# Patient Record
(demographics unavailable — no encounter records)

---

## 2024-10-30 NOTE — PROCEDURE
[FreeTextEntry1] : PFTs revealed moderate restrictive dysfunction; FEV1 was 0.95 L, which is 53.1% of predicted; faint inspiratory limb.  PFTs were performed to evaluate for SOB  FENO was 15; a normal value being less than 25 Fractional exhaled nitric oxide (FENO) is regarded as a simple, noninvasive method for assessing eosinophilic airway inflammation. Produced by a variety of cells within the lung, nitric oxide (NO) concentrations are generally low in healthy individuals. However, high concentrations of NO appear to be involved in nonspecific host defense mechanisms and chronic inflammatory diseases such as asthma. The American Thoracic Society (ATS) therefore has recommended using FENO to aid in the diagnosis and monitoring of eosinophilic airway inflammation and asthma, and for identifying steroid responsive individuals whose chronic respiratory symptoms may be caused by airway inflammation.   The American Thoracic Society (ATS) strongly recommends the use of FeNO measurement to aid in the assessment, management, and long-term monitoring of asthma. In their 2011 clinical practice guideline, the ATS emphasizes the importance of using FeNO.

## 2024-10-30 NOTE — ASSESSMENT
[FreeTextEntry1] : Mrs. Brown is 83 yo has a history of asthma, OSAS, allergies, sinus issues, abnormal CT 4/18 (nodule), mild PAH, obesity and bronchomalacia with ?need for cardiac ablation, anxiety. (still present) - better compliant with asthma Rx- reduced weight on Wegovy recent sinus infection/active asthma-s/p CHF  Her cough is multifactorial due to: -postnasal drip -asthma (flair) -GERD -obesity -out of shape -Afib -PAH -poor mechanics of breathing -bronchomalacia   Problem 1: Asthma -active due to NC -Continue Xopenex.63% nebulizer q6H -Add Trelegy 200 1 inhalation qD  -s/p Prednisone 20mg for 7 days then 10mg for 7 days 6/2024, 10/2024 -Asthma is believed to be caused by inherited (genetic) and environmental factor, but its exact cause is unknown. Asthma may be triggered by allergens, lung infections, or irritants in the air. Asthma triggers are different for each person -Inhaler technique reviewed as well as oral hygiene techniques reviewed with patient. Avoidance of cold air, extremes of temperature, rescue inhaler should be used before exercise. Order of medication reviewed with patient. Recommended use of a cool mist humidifier in the bedroom.    Problem 2: PND/allergies (active) -complete blood work to include: asthma panel, food IgE panel, IgE level, eosinophil level, vitamin D level -continue Palgic 4 mg q8H -continue Xyzal 5 mg QHS -continue Dymista nasal spray 1 sniff BID -continue Xlear saline BID -continue Mometasone cream 1% BID Environmental measures for allergies were encouraged including mattress and pillow covers, air purifier, and environmental controls.  problem 2 A: acute sinusitis -add Augmentin 875 mg BID -Patient has a clinical scenario most c/w acute sinusitis the etiology of which is unknown (bacterial/viral/fungal). Hydration, steaming, intranasal saline is needed.   Problem 3: GERD -continue Omeprazole 40mg QAM -discussed Rule of 2's; pt should avoid eating too much; too fast; too spicy; too lousy; less than two hours before bed -Things to avoid including overeating, spicy foods, tight clothing, eating within three hours of bed, this list is not all inclusive. -For treatment of reflux, possible options discussed including diet control, H2 blockers, PPIs, as well as coating motility agents discussed as treatment options. Timing of meals and proximity of last meal to sleep were discussed. If symptoms persist, a formal gastrointestinal evaluation is needed.   Problem 4: sensory neuropathic cough -continue Amitriptyline 10mg Q8H -Sensory neuropathic cough is an etiology of cough that is often realized once someone has been ruled out for common diseases such as, asthma,COPD, eosinophilic bronchitis, bronchiectasis, post nasal drip, and GERD. It sometimes develops following URI, herpes zoster outbreak in pharynx or thyroid or cervical spine injury. However, many patients have no identifiable antecedent explanation.  Problem 5: OSAS (NC) -instructed to repeat home sleep study with oral appliance (compliant) -continue oral appliance QD -continue Nuvigil  mg -Sleep apnea is associated with adverse clinical consequences which an affect most organ systems. Cardiovascular disease risk includes arrhythmias, atrial fibrillation, hypertension, coronary artery disease, and stroke. Metabolic disorders include diabetes type 2, non-alcoholic fatty liver disease. Mood disorder especially depression; and cognitive decline especially in the elderly. Associations with chronic reflux/Marquez's esophagus some but not all inclusive. -Reasons include arousal consistent with hypopnea; respiratory events most prominent in REM sleep or supine position; therefore sleep staging and body position are important for accurate diagnosis and estimation of AHI. -Treatment options discussed including CPAP/BiPAP machine, oral appliance, ProVent therapy, Oxy-Aid by Respitec, new technologies, or positional sleep.Recommended use of the CPAP machine for moderate (AHI >15), moderate to severe (AHI 15-30) and severe patients (AHI > 30). Recommended weight loss which can reduce AHI especially in weight loss of greater than 5% of BMI. Positional sleep is recommended in those with low AHI, low-moderate MBI, and younger age. For severe sleep apnea, the hypoglossal nerve stimulator was recommended as well.  Problem 6: Mild PAH - She is encouraged to have yearly ECHO's -instructed to complete a right heart cath based on ECHO results -Disorder of the pulmonary arteries, important to distinguish the difference between pulmonary arterial hypertension which is idiopathic to secondary pulmonary hypertension which is related to heart disease being diastolic dysfunction or congestive heart failure. Diagnostics include an initial echocardiogram evaluating the pulmonary artery pressures, if this is abnormal, to proceed with a VQ scan as well as a CTPA and an eventual right heart catheterization (No medication can be prescribed until the right heart catheterization). If present, the evaluation will include rheumatological blood testing, HIV testing, and potential evaluation for cirrhosis. Drug classes include PED5 (Revatio, Adcirca) ETRA (Tracleer, Macitentan, Letairis), Soluble guanylate cyclase (Adempas) Prostacyclins (Uptravi, Tyavso, Ventavis, Remodulin, or Orenitram derivatives).  problem 7: cardiac health -recommended to follow up with cardiologist Dr. Mendes -instructed to complete a right heart catheterization as per Cinthya -recommended electrophysiologist Dr. Canas/ Dr. Rdz  Problem 8: Over weight -recommended consultation with Dr. Aniceto Zaman (Wegovy/ Berberine)  -recommended "10-Day Detox Diet" by Dr. Cas Soria -Weight loss, exercise, and diet control were discussed and are highly encouraged. Treatment options were given such as, aqua therapy, and contacting a nutritionist. Recommended to use the elliptical, stationary bike, less use of treadmill. Obesity is associated with worsening asthma, shortness of breath, and potential for cardiac disease, diabetes, and other underlying medical conditions.  Problem 9: bronchomalacia -s/p dynamic chest CT revealing bronchomalacia -recommended to use Acapella device  Problem 10: abnormal chest CT (NC) -f/u chest CT 4/2019 (Overdue) -multiple prescriptions refused CAT scans are the only radiological modality to identify abnormalities w/in the lungs with regards to nodules/masses/lymph nodes. Risks, benefits were reviewed in detail. The guidelines for abnormalities include follow up CT scans at various intervals which could range from 6 weeks to 1 year intervals. If there is a change for the worse then consideration for a biopsy will be considered if you are a candidate. Second opinion evaluation with a thoracic surgeon or an interventional radiologist could be offered.  Problem 11: Health Maintenance/COVID19 Precautions -s/p Pfizer COVID 19 vaccine x 2 - Clean your hands often. Wash your hands often with soap and water for at least 20 seconds, especially after blowing your nose, coughing, or sneezing, or having been in a public place. - If soap and water are not available, use a hand  that contains at least 60% alcohol. - To the extent possible, avoid touching high-touch surfaces in public places - elevator buttons, door handles, handrails, handshaking with people, etc. Use a tissue or your sleeve to cover your hand or finger if you must touch something. - Wash your hands after touching surfaces in public places. - Avoid touching your face, nose, eyes, etc. - Clean and disinfect your home to remove germs: practice routine cleaning of frequently touched surfaces (for example: tables, doorknobs, light switches, handles, desks, toilets, faucets, sinks & cell phones) - Avoid crowds, especially in poorly ventilated spaces. Your risk of exposure to respiratory viruses like COVID-19 may increase in crowded, closed-in settings with little air circulation if there are people in the crowd who are sick. All patients are recommended to practice social distancing and stay at least 6 feet away from others. - Avoid all non-essential travel including plane trips, and especially avoid embarking on cruise ships. -If COVID-19 is spreading in your community, take extra measures to put distance between yourself and other people to further reduce your risk of being exposed to this new virus. -Stay home as much as possible. - Consider ways of getting food brought to your house through family, social, or commercial networks -Be aware that the virus has been known to live in the air up to 3 hours post exposure, cardboard up to 24 hours post exposure, copper up to 4 hours post exposure, steel and plastic up to 2-3 days post exposure. Risk of transmission from these surfaces are affected by many variables.  COVID-19 precautionary Immune Support Recommendations: -OTC Vitamin C 500mg BID -OTC Quercetin 250-500mg BID -OTC Zinc 75-100mg per day -OTC Melatonin 1 or 2mg a night -OTC Vitamin D 1-4000mg per day -OTC Tonic Water 8oz per day -Water 0.5-1 gallon per day Asthma and COVID19: You need to make sure your asthma is under control. This often requires the use of inhaled corticosteroids (and sometimes oral corticosteroids). Inhaled corticosteroids do not likely reduce your immune system's ability to fight infections, but oral corticosteroids may. It is important to use the steps above to protect yourself to limit your exposure to any respiratory virus.    Problem 12: health maintenance -given flu shot today in office -recommended strep pneumonia vaccines: Prevnar-13 vaccine, followed by Pneumo vaccine 23 on year following -recommended early intervention for URIs -recommended osteoporosis evaluations -recommended early dermatological evaluations -recommended after the age of 50 to the age of 70, colonoscopy every 5 years   F/u in 4 months for a 6 minute walk and full PFT's She is encouraged to call with any changes, concerns, or questions.

## 2024-10-30 NOTE — PHYSICAL EXAM
[No Acute Distress] : no acute distress [Normal Oropharynx] : normal oropharynx [III] : Mallampati Class: III [Normal Appearance] : normal appearance [No Neck Mass] : no neck mass [Normal Rate/Rhythm] : normal rate/rhythm [Normal S1, S2] : normal s1, s2 [No Resp Distress] : no resp distress [Clear to Auscultation Bilaterally] : clear to auscultation bilaterally [Kyphosis] : kyphosis [Benign] : benign [Normal Gait] : normal gait [No Clubbing] : no clubbing [No Cyanosis] : no cyanosis [FROM] : FROM [1+ Pitting] : 1+ pitting [Normal Color/ Pigmentation] : normal color/ pigmentation [No Focal Deficits] : no focal deficits [Oriented x3] : oriented x3 [Normal Affect] : normal affect [TextBox_2] : OW [TextBox_54] : 2/6 systolic murmur [TextBox_68] : I:E ratio 1:3; clear [TextBox_105] : 1+ lower extremity edema in mid calf

## 2024-10-30 NOTE — HISTORY OF PRESENT ILLNESS
[TextBox_4] : Ms. VARGAS is a 82 year old female with a history of asthma, OSAS, allergies, sinus issues, abnormal CT 4/18 (nodule), mild PAH, and obesity with a recent diagnosis of bronchomalacia who presents for a follow-up pulmonary evaluation. Her chief complaint is   -she notes ear and throat pain  -she notes headaches  -she notes going to [facility] for her Sx  -she notes being placed on doxycycline and steroids for five days  -after finishing her Rx her Sx returned  -she notes a recent hospitalization at [facility] for CHF  -she notes having PNA in the hospital as well -she notes high blood pressure  -she notes her legs are less swollen  -she notes wheezing  -she says she is no longer taking breathing medications because she doesn't see much change with her breathing   -she denies any headaches, nausea, emesis, chills, sweats, chest pain, chest pressure, coughing, palpitations, diarrhea, constipation, dysphagia, vertigo, arthralgias, myalgias, itchy eyes, itchy ears, heartburn, reflux, or sour taste in the mouth.

## 2024-10-30 NOTE — REASON FOR VISIT
[Follow-Up] : a follow-up visit [FreeTextEntry1] : asthma, OSAS, allergies, sinus issues, abnormal CT 4/18 (nodule), mild PAH, and obesity with a diagnosis of bronchomalacia

## 2024-10-30 NOTE — ADDENDUM
[FreeTextEntry1] : Documented by Darren Birmingham acting as a scribe for Dr. Yonathan Freeman on 10/30/2024. All medical record entries made by the Scribe were at my, Dr. Yonathan Freeman's, direction and personally dictated by me on 10/30/2024. I have reviewed the chart and agree that the record accurately reflects my personal performance of the history, physical exam, assessment and plan. I have also personally directed, reviewed, and agree with the discharge instructions.

## 2025-03-26 NOTE — ASSESSMENT
[FreeTextEntry1] : Mrs. Brown is 83 yo has a history of asthma, OSAS, allergies, sinus issues, abnormal CT 4/18 (nodule), mild PAH, obesity and bronchomalacia with ?need for cardiac ablation, anxiety. (still present) - better compliant with asthma Rx- reduced weight on Wegovy recent sinus infection/active asthma-s/p CHF - concern over weight gain/anxiety   Her cough is multifactorial due to: -postnasal drip -asthma (flair) -GERD -obesity -out of shape -Afib -PAH -poor mechanics of breathing -bronchomalacia   Problem 1: Asthma -active due to NC -Continue Xopenex.63% nebulizer q6H -Add Trelegy 200 1 inhalation qD  -s/p Prednisone 20mg for 7 days then 10mg for 7 days 6/2024, 10/2024 -Asthma is believed to be caused by inherited (genetic) and environmental factor, but its exact cause is unknown. Asthma may be triggered by allergens, lung infections, or irritants in the air. Asthma triggers are different for each person -Inhaler technique reviewed as well as oral hygiene techniques reviewed with patient. Avoidance of cold air, extremes of temperature, rescue inhaler should be used before exercise. Order of medication reviewed with patient. Recommended use of a cool mist humidifier in the bedroom.  problem 1A: PREM Weakness -recommended The Breather or Power Breath Medic -revealed by either reduction in patient's maximum inspiratory pressure (PI max) or maximum expiratory pressure (PE max), or both measured at the mouth. This can be related to a variety of medical issues such as neuromuscular disease, thyroid/parathyroid diseases, infections, poor nutrition, etc.  Problem 2: PND/allergies (quiet) -complete blood work to include: asthma panel, food IgE panel, IgE level, eosinophil level, vitamin D level -continue Palgic 4 mg q8H -continue Xyzal 5 mg QHS -continue Dymista nasal spray 1 sniff BID -continue Xlear saline BID -continue Mometasone cream 1% BID Environmental measures for allergies were encouraged including mattress and pillow covers, air purifier, and environmental controls.  problem 2 A: acute sinusitis (resolved)  -add Augmentin 875 mg BID -Patient has a clinical scenario most c/w acute sinusitis the etiology of which is unknown (bacterial/viral/fungal). Hydration, steaming, intranasal saline is needed.   Problem 3: GERD -continue Omeprazole 40mg QAM -discussed Rule of 2's; pt should avoid eating too much; too fast; too spicy; too lousy; less than two hours before bed -Things to avoid including overeating, spicy foods, tight clothing, eating within three hours of bed, this list is not all inclusive. -For treatment of reflux, possible options discussed including diet control, H2 blockers, PPIs, as well as coating motility agents discussed as treatment options. Timing of meals and proximity of last meal to sleep were discussed. If symptoms persist, a formal gastrointestinal evaluation is needed.   Problem 4: sensory neuropathic cough -continue Amitriptyline 10mg Q8H -Sensory neuropathic cough is an etiology of cough that is often realized once someone has been ruled out for common diseases such as, asthma,COPD, eosinophilic bronchitis, bronchiectasis, post nasal drip, and GERD. It sometimes develops following URI, herpes zoster outbreak in pharynx or thyroid or cervical spine injury. However, many patients have no identifiable antecedent explanation.  Problem 5: OSAS ([place]) -instructed to repeat home sleep study with oral appliance (compliant) -continue oral appliance QD -continue Nuvigil  mg -Sleep apnea is associated with adverse clinical consequences which an affect most organ systems. Cardiovascular disease risk includes arrhythmias, atrial fibrillation, hypertension, coronary artery disease, and stroke. Metabolic disorders include diabetes type 2, non-alcoholic fatty liver disease. Mood disorder especially depression; and cognitive decline especially in the elderly. Associations with chronic reflux/Marquez's esophagus some but not all inclusive. -Reasons include arousal consistent with hypopnea; respiratory events most prominent in REM sleep or supine position; therefore sleep staging and body position are important for accurate diagnosis and estimation of AHI. -Treatment options discussed including CPAP/BiPAP machine, oral appliance, ProVent therapy, Oxy-Aid by Respitec, new technologies, or positional sleep.Recommended use of the CPAP machine for moderate (AHI >15), moderate to severe (AHI 15-30) and severe patients (AHI > 30). Recommended weight loss which can reduce AHI especially in weight loss of greater than 5% of BMI. Positional sleep is recommended in those with low AHI, low-moderate MBI, and younger age. For severe sleep apnea, the hypoglossal nerve stimulator was recommended as well.  Problem 6: Mild PAH - She is encouraged to have yearly ECHO's -instructed to complete a right heart cath based on ECHO results -Disorder of the pulmonary arteries, important to distinguish the difference between pulmonary arterial hypertension which is idiopathic to secondary pulmonary hypertension which is related to heart disease being diastolic dysfunction or congestive heart failure. Diagnostics include an initial echocardiogram evaluating the pulmonary artery pressures, if this is abnormal, to proceed with a VQ scan as well as a CTPA and an eventual right heart catheterization (No medication can be prescribed until the right heart catheterization). If present, the evaluation will include rheumatological blood testing, HIV testing, and potential evaluation for cirrhosis. Drug classes include PED5 (Revatio, Adcirca) ETRA (Tracleer, Macitentan, Letairis), Soluble guanylate cyclase (Adempas) Prostacyclins (Uptravi, Tyavso, Ventavis, Remodulin, or Orenitram derivatives).  problem 7: cardiac health -recommended to follow up with cardiologist Dr. Mendes -instructed to complete a right heart catheterization as per Cinthya -recommended electrophysiologist Dr. Canas/ Dr. Avalos  Problem 8: Over weight -recommended consultation with Dr. Aniceto Zaman (Wegovy/ Berberine)  - move to Zepbound  -recommended "10-Day Detox Diet" by Dr. Cas Sroia -Weight loss, exercise, and diet control were discussed and are highly encouraged. Treatment options were given such as, aqua therapy, and contacting a nutritionist. Recommended to use the elliptical, stationary bike, less use of treadmill. Obesity is associated with worsening asthma, shortness of breath, and potential for cardiac disease, diabetes, and other underlying medical conditions.  Problem 9: bronchomalacia -s/p dynamic chest CT revealing bronchomalacia -recommended to use Acapella device  Problem 10: abnormal chest CT ([place]) -f/u chest CT 4/2019 (Overdue) -multiple prescriptions refused - multiple times  CAT scans are the only radiological modality to identify abnormalities w/in the lungs with regards to nodules/masses/lymph nodes. Risks, benefits were reviewed in detail. The guidelines for abnormalities include follow up CT scans at various intervals which could range from 6 weeks to 1 year intervals. If there is a change for the worse then consideration for a biopsy will be considered if you are a candidate. Second opinion evaluation with a thoracic surgeon or an interventional radiologist could be offered.  Problem 11: Health Maintenance/COVID19 Precautions -s/p Pfizer COVID 19 vaccine x 2 - Clean your hands often. Wash your hands often with soap and water for at least 20 seconds, especially after blowing your nose, coughing, or sneezing, or having been in a public place. - If soap and water are not available, use a hand  that contains at least 60% alcohol. - To the extent possible, avoid touching high-touch surfaces in public places - elevator buttons, door handles, handrails, handshaking with people, etc. Use a tissue or your sleeve to cover your hand or finger if you must touch something. - Wash your hands after touching surfaces in public places. - Avoid touching your face, nose, eyes, etc. - Clean and disinfect your home to remove germs: practice routine cleaning of frequently touched surfaces (for example: tables, doorknobs, light switches, handles, desks, toilets, faucets, sinks & cell phones) - Avoid crowds, especially in poorly ventilated spaces. Your risk of exposure to respiratory viruses like COVID-19 may increase in crowded, closed-in settings with little air circulation if there are people in the crowd who are sick. All patients are recommended to practice social distancing and stay at least 6 feet away from others. - Avoid all non-essential travel including plane trips, and especially avoid embarking on cruise ships. -If COVID-19 is spreading in your community, take extra measures to put distance between yourself and other people to further reduce your risk of being exposed to this new virus. -Stay home as much as possible. - Consider ways of getting food brought to your house through family, social, or commercial networks -Be aware that the virus has been known to live in the air up to 3 hours post exposure, cardboard up to 24 hours post exposure, copper up to 4 hours post exposure, steel and plastic up to 2-3 days post exposure. Risk of transmission from these surfaces are affected by many variables.  COVID-19 precautionary Immune Support Recommendations: -OTC Vitamin C 500mg BID -OTC Quercetin 250-500mg BID -OTC Zinc 75-100mg per day -OTC Melatonin 1 or 2mg a night -OTC Vitamin D 1-4000mg per day -OTC Tonic Water 8oz per day -Water 0.5-1 gallon per day Asthma and COVID19: You need to make sure your asthma is under control. This often requires the use of inhaled corticosteroids (and sometimes oral corticosteroids). Inhaled corticosteroids do not likely reduce your immune system's ability to fight infections, but oral corticosteroids may. It is important to use the steps above to protect yourself to limit your exposure to any respiratory virus.    Problem 12: health maintenance -s/p flu shot 2024 refused -recommended strep pneumonia vaccines: Prevnar-13 vaccine, followed by Pneumo vaccine 23 on year following -recommended early intervention for URIs -recommended osteoporosis evaluations -recommended early dermatological evaluations -recommended after the age of 50 to the age of 70, colonoscopy every 5 years   F/u in 4 months for a 6 minute walk and full PFT's She is encouraged to call with any changes, concerns, or questions.

## 2025-03-26 NOTE — REASON FOR VISIT
[Follow-Up] : a follow-up visit [Family Member] : family member [FreeTextEntry1] : asthma, OSAS, allergies, sinus issues, abnormal CT 4/18 (nodule), mild PAH, and obesity with a diagnosis of bronchomalacia

## 2025-03-26 NOTE — HISTORY OF PRESENT ILLNESS
[TextBox_4] : Ms. VARGAS is a 82 year old female with a history of asthma, OSAS, allergies, sinus issues, abnormal CT 4/18 (nodule), mild PAH, and obesity with a recent diagnosis of bronchomalacia who presents for a follow-up pulmonary evaluation. Her chief complaint is   -she notes she has been having excess mucus production - especially when her  comes on  -she notes her sputum is clear  -she notes chest burning sensation -she notes AFIB has been stable  -she notes weight gain (10 lbs)  -she notes trouble getting her weight loss drug of choice -she notes good quality of sleep -she notes snoring  -she notes appetite is stable -she notes diet is good -she notes energy levels are poor -she notes leg pain  -she notes SOB which occurs when she is emotional  -she notes being on Xanex - rarely taken  -she notes her Xanax helps her SOB  -she notes taking vitamin C, turmeric, potassium  -she notes her cardiologist prescribed at weight loss drug for herself  -she notes using trelegy   -she denies any headaches, nausea, emesis, fever, chills, sweats, chest pain, chest pressure, coughing, wheezing, palpitations, diarrhea, constipation, dysphagia, vertigo, leg swelling, itchy eyes, itchy ears, heartburn, reflux, or sour taste in the mouth.

## 2025-03-26 NOTE — ADDENDUM
[FreeTextEntry1] : Documented by Darren Birmingham acting as a scribe for Dr. Yonathan Freeman on 03/26/2025. All medical record entries made by the Scribe were at my, Dr. Yonathan Freeman's, direction and personally dictated by me on 03/26/2025. I have reviewed the chart and agree that the record accurately reflects my personal performance of the history, physical exam, assessment and plan. I have also personally directed, reviewed, and agree with the discharge instructions.

## 2025-03-26 NOTE — PHYSICAL EXAM
[No Acute Distress] : no acute distress [Normal Oropharynx] : normal oropharynx [III] : Mallampati Class: III [Normal Appearance] : normal appearance [No Neck Mass] : no neck mass [Normal Rate/Rhythm] : normal rate/rhythm [Normal S1, S2] : normal s1, s2 [No Resp Distress] : no resp distress [Clear to Auscultation Bilaterally] : clear to auscultation bilaterally [Kyphosis] : kyphosis [Benign] : benign [Normal Gait] : normal gait [No Clubbing] : no clubbing [No Cyanosis] : no cyanosis [FROM] : FROM [1+ Pitting] : 1+ pitting [Normal Color/ Pigmentation] : normal color/ pigmentation [No Focal Deficits] : no focal deficits [Oriented x3] : oriented x3 [Normal Affect] : normal affect [TextBox_2] : OW [TextBox_54] : 2/6 systolic murmur [TextBox_68] : I:E ratio 1:3; clear [TextBox_105] : 1+ lower extremity edema

## 2025-03-26 NOTE — PROCEDURE
[FreeTextEntry1] : Full PFT reveals mild obstructive dysfunction and restrictive dysfunction; FEV1 was  0.97L which is 47% of predicted; moderately reduced lung volumes; mildly reduced diffusion at 11.57, which is 61% of predicted; normal flow volume loop. PFTs were performed to evaluate for SOB PREM: MIP severely reduced 26% and % WNL